# Patient Record
(demographics unavailable — no encounter records)

---

## 2024-11-04 NOTE — HISTORY OF PRESENT ILLNESS
[FreeTextEntry1] :  Patient here for periodic assessment and blood work. [de-identified] :  Patient here for periodic assessment and blood work. also c/o LUQ pain x 1 week, intermittent, non-radiating and not related to po intake. the patient was not able tolerate the statin due to muscle pain

## 2024-11-27 NOTE — ASSESSMENT
[FreeTextEntry1] : 62 yo female with a hx of chronic atrophic gastritis and a well differentiated NET  - Repeat EGD at [facility] - D/w pt regarding escort post-procedure - Risks of the procedure including bleeding, perforation, etc d/w the patient - NPO after midnight

## 2024-11-27 NOTE — HISTORY OF PRESENT ILLNESS
[de-identified] : 8/10/2023 Normal esophagus, scar from prior mucosal resection in anterior body, otherwise normal stomach with multiple mapping biopsies, and normal duodenum (bx--> moderate chronic inflammation and reactive changes, no IM or dysplasia identified, no HP; lesser and greater curvature of gastric body showing IM, moderate chronic inflammation and reactive changes).  1/13/2023 Normal esophagus, patch of mucosa with granularity and erythema noted on distal curve of gastric body s/p removal with band EMR with clipping performed, whole stomach with atrophy and multiple biopsies performed (bx --> IM and marked chronic gastritis measuring 1.6 cm, moderate chronic gastritis and active gastritis and IM; no IHC staining for synaptophysin and chromogranin).   [FreeTextEntry1] : June 2022 -- normal, repeat in 5-10 years -- Dr. Almanzar.

## 2025-03-11 NOTE — HISTORY OF PRESENT ILLNESS
[de-identified] : Follow-up of neck pain. Her neck pain came back. She takes Meloxicam and muscle relaxer as needed. No radicular arm pain.  Below is the history of initial visit. The patient is 61 years old female who has neck pain for 1.5 months. No radicular arm pain. No clear cause of the symptoms.   Pain Level:  10 Duration of Symptoms:  1.5 months Symptom course:  Getting worse Accident:  No   Previous Treatment:    Pain meds:  Yes, NSAIDs,    Physical therapy:  No    Epidural steroid injection:  No

## 2025-03-11 NOTE — CONSULT LETTER
[Dear  ___] : Dear  [unfilled], [Consult Letter:] : I had the pleasure of evaluating your patient, [unfilled]. [Please see my note below.] : Please see my note below. [Consult Closing:] : Thank you very much for allowing me to participate in the care of this patient.  If you have any questions, please do not hesitate to contact me. [Sincerely,] : Sincerely, [FreeTextEntry3] : Jackeiln Simpson MD PhD

## 2025-03-11 NOTE — PHYSICAL EXAM
[de-identified] : The patient is alert, cooperative, and oriented x 3. Pupils are equal and round. The patient does not have skin rash.   Gait is normal. Neck ROM is within normal range. Tenderness at PVM. No myelopathy hand sign. Spurling test negative. DTR normal range. Motor and sensory are basically normal throughout bilateral U/E and L/E. Circulation of legs is normal. Bladder and bowel functions are normal.  Rt shoulder: no redness, no swelling, no clear tenderness, ROM full with mild pain [de-identified] : Cervical spine x-ray taken recently at [facility] shows moderate-severe degenerative disc disease and spondylosis C5-7.

## 2025-03-11 NOTE — DISCUSSION/SUMMARY
[de-identified] : I examined, evaluated, and discussed with the patient. Follow-up of neck pain. Neck pain came back. No myelopathy. No radiculopathy.  Based on physical exam and image findings, the patient diagnosis is cervical degenerative disc disease and spondylosis C5-7 and right shoulder parker-capsulitis.  Treatment plan is medications PRN and PT and home exercise. New PT prescription given. She still has medications.   The patient understands everything and all questions were answered. The patient will return 3 months.

## 2025-03-16 NOTE — PHYSICAL EXAM
[No Acute Distress] : no acute distress [Well Nourished] : well nourished [Well Developed] : well developed [Normal Sclera/Conjunctiva] : normal sclera/conjunctiva [Well-Appearing] : well-appearing [PERRL] : pupils equal round and reactive to light [EOMI] : extraocular movements intact [Normal Outer Ear/Nose] : the outer ears and nose were normal in appearance [Normal Oropharynx] : the oropharynx was normal [No JVD] : no jugular venous distention [No Lymphadenopathy] : no lymphadenopathy [Supple] : supple [Thyroid Normal, No Nodules] : the thyroid was normal and there were no nodules present [No Respiratory Distress] : no respiratory distress  [No Accessory Muscle Use] : no accessory muscle use [Clear to Auscultation] : lungs were clear to auscultation bilaterally [Normal Rate] : normal rate  [Regular Rhythm] : with a regular rhythm [Normal S1, S2] : normal S1 and S2 [No Murmur] : no murmur heard [No Carotid Bruits] : no carotid bruits [No Abdominal Bruit] : a ~M bruit was not heard ~T in the abdomen [No Varicosities] : no varicosities [Pedal Pulses Present] : the pedal pulses are present [No Edema] : there was no peripheral edema [No Palpable Aorta] : no palpable aorta [No Extremity Clubbing/Cyanosis] : no extremity clubbing/cyanosis [Soft] : abdomen soft [Non Tender] : non-tender [Non-distended] : non-distended [No Masses] : no abdominal mass palpated [No HSM] : no HSM [Normal Bowel Sounds] : normal bowel sounds [Normal Posterior Cervical Nodes] : no posterior cervical lymphadenopathy [Normal Anterior Cervical Nodes] : no anterior cervical lymphadenopathy [No Spinal Tenderness] : no spinal tenderness [No CVA Tenderness] : no CVA  tenderness [No Joint Swelling] : no joint swelling [Grossly Normal Strength/Tone] : grossly normal strength/tone [No Rash] : no rash [Coordination Grossly Intact] : coordination grossly intact [No Focal Deficits] : no focal deficits [Normal Gait] : normal gait [Deep Tendon Reflexes (DTR)] : deep tendon reflexes were 2+ and symmetric [Normal Affect] : the affect was normal [Normal Insight/Judgement] : insight and judgment were intact

## 2025-03-16 NOTE — HISTORY OF PRESENT ILLNESS
[FreeTextEntry1] :  Patient here for periodic assessment and blood work. [de-identified] :  Patient here for periodic assessment and blood work. c/o tinnitus c/o fatigue upon exertion x 2 weeks b/l shoulder pain. s/p orhto f/u and PT> now resolved c/o neck pain > on going PT

## 2025-03-16 NOTE — HISTORY OF PRESENT ILLNESS
[FreeTextEntry1] :  Patient here for periodic assessment and blood work. [de-identified] :  Patient here for periodic assessment and blood work. c/o tinnitus c/o fatigue upon exertion x 2 weeks b/l shoulder pain. s/p orhto f/u and PT> now resolved c/o neck pain > on going PT

## 2025-03-17 NOTE — PHYSICAL EXAM
[Well Developed] : well developed [Well Nourished] : well nourished [No Acute Distress] : no acute distress [Normal Venous Pressure] : normal venous pressure [No Carotid Bruit] : no carotid bruit [Normal S1, S2] : normal S1, S2 [No Murmur] : no murmur [No Rub] : no rub [Clear Lung Fields] : clear lung fields [Good Air Entry] : good air entry [No Respiratory Distress] : no respiratory distress  [Normal Gait] : normal gait [No Edema] : no edema [No Cyanosis] : no cyanosis [No Clubbing] : no clubbing [Moves all extremities] : moves all extremities [No Focal Deficits] : no focal deficits [Normal Speech] : normal speech [Alert and Oriented] : alert and oriented [Normal memory] : normal memory

## 2025-03-17 NOTE — REVIEW OF SYSTEMS
[Feeling Fatigued] : feeling fatigued [Dyspnea on exertion] : dyspnea during exertion [Chest Discomfort] : chest discomfort [Anxiety] : anxiety [Under Stress] : under stress [Fever] : no fever [Headache] : no headache [Chills] : no chills [Blurry Vision] : no blurred vision [Seeing Double (Diplopia)] : no diplopia [SOB] : no shortness of breath [Lower Ext Edema] : no extremity edema [Leg Claudication] : no intermittent leg claudication [Palpitations] : no palpitations [Orthopnea] : no orthopnea [PND] : no PND [Syncope] : no syncope [Cough] : no cough [Wheezing] : no wheezing [Dizziness] : no dizziness [Tremor] : no tremor was seen [Numbness (Hypoesthesia)] : no numbness [Convulsions] : no convulsions [Tingling (Paresthesia)] : no tingling [Weakness] : no weakness [Limb Weakness (Paresis)] : no limb weakness (Paresis) [Speech Disturbance] : no speech disturbance [Confusion] : no confusion was observed [Memory Lapses Or Loss] : no memory lapses or loss [Depression] : no depression [Suicidal] : not suicidal [Easy Bleeding] : no tendency for easy bleeding [Swollen Glands] : no swollen glands [Easy Bruising] : no tendency for easy bruising

## 2025-03-17 NOTE — HISTORY OF PRESENT ILLNESS
[FreeTextEntry1] : MERYL GONZALEZ is a 61 y.o. F with medical history significant for HLD, HTN (>20yrs). She is here for cardiac evaluation of fatigue and BAJWA  She reports feeling fatigue and out of breath w exertion during the past couple of weeks.  She has also noted chest pressure with exertion during the past couple of weeks.  She reports occ palpitations during the past couple of weeks. Lasts < 1 min. Not a.w LH or syncope.  LDL is very elevated. She is not taking rosuvastatin due to myalgias. She states she does not tolerate it. She has tried simvastatin and atorvastatin in the past but does not tolerate them.   Denies SOB, orthopnea, dizziness, syncope, LH, edema Patient denies changes in her exercise tolerance during the past 6 months. She can walk 10 blocks and can climb 2-3 flights of stairs.  Sleeps with 1 pillow  She denies history of MI, CVA, DM, smoking Denies family history of premature ASCVD and /or SCD  No hospitalizations in the past 3 years.   MEDS Losartan 100 mg Rosuvastatin 5 mg    Data Labs (3/15/25): TSH 2.58, K 4.0, CRE 0.75, LFT's wnl, eGFR 91, , , HDL 56, , NON-, A1C 5.7, HGB 13.5, HCT 40.7.  ECG (3/15/25): NSR at 67 bpm w nl axis and no STT abn

## 2025-03-17 NOTE — ASSESSMENT
[FreeTextEntry1] : MERYL GONZALEZ is a 61 y.o. F with medical history significant for HLD/FH, HTN, prediabetes. She is here for cardiac evaluation of BAJWA/chest pressure/palpitations/fatigue as described above.   - ECG performed and reviewed today in office.  - I reviewed labs from 3/15/25. - schedule a CCTA to evaluate for obstructive CAD - start a trial of ASA 81 mg  - start amlodipine 2.5 mg daily  - she does not tolerate statins. Start ezetimibe 10 mg for now - to monitor BP at home and keep a BP log to bring to next visit  - Increase ambulation as tolerated to aim for approximately 30 minutes of moderate activity 5 days a week.  Heart healthy diet low in sodium, sugars and saturated fats and high in fruits, vegetables and whole grains.  Weight loss.   Patient advised to go to the nearest ED whenever any symptoms persist and/or worsens.  Patient/Family/Caregiver verbalized complete understanding of these instructions.  I spent a total of 45 minutes with more than 50% spent on counseling and coordinating care.    RTO in 2 wks.